# Patient Record
Sex: MALE | Race: WHITE
[De-identification: names, ages, dates, MRNs, and addresses within clinical notes are randomized per-mention and may not be internally consistent; named-entity substitution may affect disease eponyms.]

---
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
## 2021-04-04 ENCOUNTER — HOSPITAL ENCOUNTER (EMERGENCY)
Dept: HOSPITAL 8 - ED | Age: 21
Discharge: HOME | End: 2021-04-04
Payer: MEDICAID

## 2021-04-04 VITALS — BODY MASS INDEX: 17.26 KG/M2 | HEIGHT: 70 IN | WEIGHT: 120.59 LBS

## 2021-04-04 VITALS — DIASTOLIC BLOOD PRESSURE: 68 MMHG | SYSTOLIC BLOOD PRESSURE: 102 MMHG
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
## 2021-04-04 DIAGNOSIS — R55: ICD-10-CM
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
## 2021-04-04 DIAGNOSIS — R94.31: ICD-10-CM

## 2021-04-04 DIAGNOSIS — S01.511A: Primary | ICD-10-CM

## 2021-04-04 DIAGNOSIS — R42: ICD-10-CM

## 2021-04-04 DIAGNOSIS — R51.9: ICD-10-CM

## 2021-04-04 LAB
ALBUMIN SERPL-MCNC: 4.7 G/DL (ref 3.4–5)
ANION GAP SERPL CALC-SCNC: 5 MMOL/L (ref 5–15)
BASOPHILS # BLD AUTO: 0 X10^3/UL (ref 0–0.3)
BASOPHILS NFR BLD AUTO: 0 % (ref 0–1)
CALCIUM SERPL-MCNC: 9.3 MG/DL (ref 8.5–10.1)
CHLORIDE SERPL-SCNC: 105 MMOL/L (ref 98–107)
CREAT SERPL-MCNC: 0.97 MG/DL (ref 0.7–1.3)
EOSINOPHIL # BLD AUTO: 0.1 X10^3/UL (ref 0–0.8)
EOSINOPHIL NFR BLD AUTO: 1 % (ref 1–7)
ERYTHROCYTE [DISTWIDTH] IN BLOOD BY AUTOMATED COUNT: 12.9 % (ref 9.4–14.8)
LYMPHOCYTES # BLD AUTO: 0.9 X10^3/UL (ref 1–6.1)
LYMPHOCYTES NFR BLD AUTO: 10 % (ref 22–44)
MCH RBC QN AUTO: 33.2 PG (ref 27.5–34.5)
MCHC RBC AUTO-ENTMCNC: 34.7 G/DL (ref 33.2–36.2)
MD: NO
MONOCYTES # BLD AUTO: 0.7 X10^3/UL (ref 0–1.4)
MONOCYTES NFR BLD AUTO: 7 % (ref 2–9)
NEUTROPHILS # BLD AUTO: 7.5 X10^3/UL (ref 1.8–8)
NEUTROPHILS NFR BLD AUTO: 82 % (ref 42–75)
PLATELET # BLD AUTO: 243 X10^3/UL (ref 130–400)
PMV BLD AUTO: 8.9 FL (ref 7.4–10.4)
RBC # BLD AUTO: 4.92 X10^6/UL (ref 4.38–5.82)

## 2021-04-04 PROCEDURE — 80048 BASIC METABOLIC PNL TOTAL CA: CPT

## 2021-04-04 PROCEDURE — 12052 INTMD RPR FACE/MM 2.6-5.0 CM: CPT

## 2021-04-04 PROCEDURE — 85025 COMPLETE CBC W/AUTO DIFF WBC: CPT

## 2021-04-04 PROCEDURE — 36415 COLL VENOUS BLD VENIPUNCTURE: CPT

## 2021-04-04 PROCEDURE — 93005 ELECTROCARDIOGRAM TRACING: CPT

## 2021-04-04 PROCEDURE — 99285 EMERGENCY DEPT VISIT HI MDM: CPT

## 2021-04-04 PROCEDURE — 70486 CT MAXILLOFACIAL W/O DYE: CPT

## 2021-04-04 PROCEDURE — 96360 HYDRATION IV INFUSION INIT: CPT

## 2021-04-04 PROCEDURE — 82040 ASSAY OF SERUM ALBUMIN: CPT

## 2021-04-04 NOTE — NUR
PT PROVIDED A PACK OF CRACKERS AND SOME OJ TO SIP ON BEFORE ATTEMPTING TO 
STAND. PT WITH NO SIGNS OR SYMPTOMS OF ACUTE DSITRESS NOTED RESPIRATIONS EVEN 
AND UNLABORED

## 2021-04-04 NOTE — NUR
MD AT BEDSIDE TO ASSESS. PT SUPINE IN [facility] WITH MOTHER AT BEDSIDE NO SIGNS OR 
SYMPTOMS OF ACUTE DISTRESS NOTED RESPIRATIONS EVEN AND UNLABORED

## 2021-04-04 NOTE — NUR
DR LYLE AT BEDSIDE TO ASSESS. PT IN BED WITH NO SIGNS OR SYMPTOMS OF ACUTE 
DISTRSES NOTED RESPIRATIONS EVEN AND UNLABORED

## 2021-04-09 ENCOUNTER — HOSPITAL ENCOUNTER (EMERGENCY)
Dept: HOSPITAL 8 - ED | Age: 21
Discharge: HOME | End: 2021-04-09
Payer: MEDICAID

## 2021-04-09 VITALS — DIASTOLIC BLOOD PRESSURE: 91 MMHG | SYSTOLIC BLOOD PRESSURE: 135 MMHG

## 2021-04-09 VITALS — HEIGHT: 70 IN | WEIGHT: 118.83 LBS | BODY MASS INDEX: 17.01 KG/M2
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
## 2021-04-09 DIAGNOSIS — S01.511D: Primary | ICD-10-CM

## 2021-04-09 PROCEDURE — 99282 EMERGENCY DEPT VISIT SF MDM: CPT

## 2021-04-09 NOTE — NUR
CARE FOR DC PROVIDED.  NO ACUTE DISTRESS NOTED.  NO IV TO DC.  REVIEWED DC 
INSTRUCTIONS WITH PT.  UNDERSTANDING VERBALIZED.  PT LEFT AMB, GAIT STEADY.

## 2021-04-20 ENCOUNTER — HOSPITAL ENCOUNTER (OUTPATIENT)
Dept: HOSPITAL 8 - OR | Age: 21
Discharge: HOME | End: 2021-04-20
Attending: OTOLARYNGOLOGY
Payer: MEDICAID

## 2021-04-20 VITALS — SYSTOLIC BLOOD PRESSURE: 108 MMHG | DIASTOLIC BLOOD PRESSURE: 72 MMHG

## 2021-04-20 VITALS — BODY MASS INDEX: 16.95 KG/M2 | WEIGHT: 118.39 LBS | HEIGHT: 70 IN
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
## 2021-04-20 DIAGNOSIS — Z20.822: ICD-10-CM
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
## 2021-04-20 DIAGNOSIS — F12.90: ICD-10-CM

## 2021-04-20 DIAGNOSIS — S02.2XXA: Primary | ICD-10-CM

## 2021-04-20 DIAGNOSIS — F17.210: ICD-10-CM
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
## 2021-04-20 DIAGNOSIS — Z72.89: ICD-10-CM

## 2021-04-20 PROCEDURE — 87635 SARS-COV-2 COVID-19 AMP PRB: CPT

## 2021-04-20 PROCEDURE — 30520 REPAIR OF NASAL SEPTUM: CPT

## 2024-01-24 ENCOUNTER — OFFICE VISIT (OUTPATIENT)
Dept: MEDICAL GROUP | Facility: CLINIC | Age: 24
End: 2024-01-24

## 2024-01-24 VITALS
WEIGHT: 124.2 LBS | BODY MASS INDEX: 17.78 KG/M2 | SYSTOLIC BLOOD PRESSURE: 128 MMHG | OXYGEN SATURATION: 97 % | HEIGHT: 70 IN | TEMPERATURE: 98.4 F | DIASTOLIC BLOOD PRESSURE: 88 MMHG | HEART RATE: 98 BPM

## 2024-01-24 DIAGNOSIS — R55 VASOVAGAL SYNCOPE: ICD-10-CM

## 2024-01-24 DIAGNOSIS — R59.9 LYMPH NODE ENLARGEMENT: ICD-10-CM

## 2024-01-24 PROCEDURE — 3079F DIAST BP 80-89 MM HG: CPT | Performed by: HEALTH CARE PROVIDER

## 2024-01-24 PROCEDURE — 3074F SYST BP LT 130 MM HG: CPT | Performed by: HEALTH CARE PROVIDER

## 2024-01-24 PROCEDURE — 99203 OFFICE O/P NEW LOW 30 MIN: CPT | Mod: GE | Performed by: HEALTH CARE PROVIDER

## 2024-01-24 NOTE — ASSESSMENT & PLAN NOTE
History is most consistent with vasovagal syncope. Low concern for cardiac etiology given his prodromal symptoms, young age, benign family history. Discussed possibly utility of EKG and basic lab work but he declines at this time. Will consider further testing if another episode of syncope occurs.

## 2024-01-24 NOTE — ASSESSMENT & PLAN NOTE
Presentation and exam is most consistent with reactive submandibular lymph node, likely related to viral illness. No red flag symptoms. Recommend watchful waiting with expectation of improvement in next 2-4 weeks. If new symptoms develop or current symptoms worsen, he will send me MyChart message and will consider ordering CBC, soft tissue US.

## 2024-06-16 ENCOUNTER — HOSPITAL ENCOUNTER (OUTPATIENT)
Dept: RADIOLOGY | Facility: MEDICAL CENTER | Age: 24
End: 2024-06-16
Attending: HEALTH CARE PROVIDER

## 2024-06-16 DIAGNOSIS — R59.9 LYMPH NODE ENLARGEMENT: ICD-10-CM

## 2024-06-16 PROCEDURE — 76536 US EXAM OF HEAD AND NECK: CPT

## 2024-07-18 NOTE — PROGRESS NOTES
"  [facility]    Subjective:     CC: Establish Care    HPI:   Marques Aden is a 23 y.o. male with:    Problem   Lymph Node Enlargement    Patient describes 2 week history of palpable mass of anterior neck on R side of larynx. It is mildly tender. It has not changed in size since noted two weeks ago (around 1/10/24). He states that he has also experienced mild sore throat at times in past few weeks. No fever or chills. No night sweats. No weight changes. No dysphagia.     Syncope    22 yo M presents to establish care. He describes history of syncope that has occurred approximately 4-5 times since age 13 yo. He has previously received workup with no underlying etiology found.     The last episode that he describes was about 1 year ago. He was eating in restaurant when he felt very warm, nauseated, and lightheaded. He attempted to go to bathroom since he had nausea and thought he needed to vomit. His lightheadedness persisted, he sat down, then fell to floor. LOC lasted for a few seconds. No episodes of syncope since that time.         No current [facility]-ordered outpatient medications on file.     No current [facility]-ordered facility-administered medications on file.         ROS:  Negative except as noted in HPI        Objective:     Exam:  /88 (BP Location: Left arm, Patient Position: Sitting, BP Cuff Size: Adult)   Pulse 98   Temp 36.9 °C (98.4 °F) (Temporal)   Ht 1.778 m (5' 10\")   Wt 56.3 kg (124 lb 3.2 oz)   SpO2 97%   BMI 17.82 kg/m²  Body mass index is 17.82 kg/m².    Gen:  Alert and oriented, No apparent distress.  HEENT: Neck is supple, single palpable R submandibular lymph node with mild tenderness, EOMI, no pharyngeal erythema or exudate  Lungs:  Normal effort, CTA bilaterally, no wheezes, rhonchi, or rales  CV:  Regular rate and rhythm. No murmurs, rubs, or gallops.  Abd:      Soft, non-tender, non-distended  Ext:  No clubbing, cyanosis, edema.      Labs:     None      Assessment & Plan: " -Increased left sided weakness and dysarthria c/t baseline;   -no stroke on MRI  -possible recrudescence of prior CVA?  - BP goal: permissive htn to 220   - Antiplatelet: ASA and plavix for now; duration TBD based on review of vessel imaging  - TTE ordered; pending   - check routine EEG given cogntiive complaint  -Neurology following       23 y.o. male with the following -     Problem List Items Addressed This Visit       Syncope     History is most consistent with vasovagal syncope. Low concern for cardiac etiology given his prodromal symptoms, young age, benign family history. Discussed possibly utility of EKG and basic lab work but he declines at this time. Will consider further testing if another episode of syncope occurs.         Lymph node enlargement     Presentation and exam is most consistent with reactive submandibular lymph node, likely related to viral illness. No red flag symptoms. Recommend watchful waiting with expectation of improvement in next 2-4 weeks. If new symptoms develop or current symptoms worsen, he will send me [employer] message and will consider ordering CBC, soft tissue US.                Return in about 1 year (around 1/24/2025).          Randall Perez MD  [facility]  PGY-3

## 2025-04-10 ENCOUNTER — OFFICE VISIT (OUTPATIENT)
Dept: MEDICAL GROUP | Facility: CLINIC | Age: 25
End: 2025-04-10

## 2025-04-10 VITALS
OXYGEN SATURATION: 95 % | BODY MASS INDEX: 17.47 KG/M2 | HEART RATE: 100 BPM | SYSTOLIC BLOOD PRESSURE: 112 MMHG | TEMPERATURE: 99.1 F | DIASTOLIC BLOOD PRESSURE: 74 MMHG | HEIGHT: 70 IN | WEIGHT: 122 LBS | RESPIRATION RATE: 12 BRPM

## 2025-04-10 DIAGNOSIS — R55 VASOVAGAL SYNCOPE: ICD-10-CM

## 2025-04-10 DIAGNOSIS — R00.2 PALPITATIONS: ICD-10-CM

## 2025-04-10 DIAGNOSIS — R07.9 CHEST PAIN, UNSPECIFIED TYPE: ICD-10-CM

## 2025-04-10 PROCEDURE — 3074F SYST BP LT 130 MM HG: CPT

## 2025-04-10 PROCEDURE — 99213 OFFICE O/P EST LOW 20 MIN: CPT

## 2025-04-10 PROCEDURE — 3078F DIAST BP <80 MM HG: CPT

## 2025-04-10 ASSESSMENT — ENCOUNTER SYMPTOMS
DIZZINESS: 1
FEVER: 0
CHILLS: 0
ABDOMINAL PAIN: 0
COUGH: 0
ORTHOPNEA: 0
HEADACHES: 0
VOMITING: 0
HEARTBURN: 0
PALPITATIONS: 1
DIARRHEA: 0
SEIZURES: 0
BLOOD IN STOOL: 0
CONSTIPATION: 0
SHORTNESS OF BREATH: 0
WEAKNESS: 0
NAUSEA: 0
BACK PAIN: 0

## 2025-04-10 NOTE — ASSESSMENT & PLAN NOTE
Most likely MSK (started around time he lifted some heavy objects, reproducible with palpation on physical exam, exacerbated by movement, no exercise intolerance), but given palpitations and pmhx of syncope, will work up with labs, cxr, EKG.  - Pt self-pay, declines EKG due to cost

## 2025-04-10 NOTE — PROGRESS NOTES
This note is formatted in an APSO format, for additional subjective and objective evaluation please scroll to the bottom of the note.    CC:  Chief Complaint   Patient presents with    Requesting Labs    Chest Pain     Assessment/Plan:  Problem List Items Addressed This Visit       Chest pain    Most likely MSK (started around time he lifted some heavy objects, reproducible with palpation on physical exam, exacerbated by movement, no exercise intolerance), but given palpitations and pmhx of syncope, will work up with labs, cxr, EKG.  - Pt self-pay, declines EKG due to cost           Relevant Orders    EKG - Clinic Performed    Basic Metabolic Panel    TSH WITH REFLEX TO FT4    [facility]    DX-CHEST-2 VIEWS    Palpitations    Relevant Orders    EKG - Clinic Performed    Basic Metabolic Panel    TSH WITH REFLEX TO FT4    [facility]    DX-CHEST-2 VIEWS    Syncope      Orders Placed This Encounter    DX-CHEST-2 VIEWS    Basic Metabolic Panel    TSH WITH REFLEX TO FT4    [facility]    EKG - Clinic Performed       HISTORY OF PRESENT ILLNESS:     Chest pain, feels like a muscle strain. Couple months. Cough, laugh, takes a deep breath. Sometimes sharp like a knife, sometimes dull. He feels muscle soreness constantly.   It feels like he gets palpitations once every 2 weeks or so. They last 30 sec before improving. Started about 2 months ago.  This started around the time that he spent a day lifting some heavy tires.  No shortness of breath. Not getting weak or tired.    He gets hot and shaky, since he was 12. He has passed out a couple times. Last time it happened he hydrated well, every time it was hot out. Near syncope 6 months ago.    He does not lift weights, no exercise. He does bartending.    Review of Systems   Constitutional:  Negative for chills and fever.   HENT:  Negative for congestion.    Respiratory:  Negative for cough and shortness of breath.    Cardiovascular:  Positive for chest pain and palpitations.  "Negative for orthopnea and leg swelling.   Gastrointestinal:  Negative for abdominal pain, blood in stool, constipation, diarrhea, heartburn, nausea and vomiting.   Genitourinary:  Negative for dysuria.   Musculoskeletal:  Negative for back pain.   Neurological:  Positive for dizziness (related to syncopal/presyncopal episodes). Negative for seizures, weakness and headaches.         Problem   Chest Pain   Palpitations   Syncope    SM Jan '24:  24 yo M presents to [facility] care. He describes history of syncope that has occurred approximately 4-5 times since age 11 yo. He has previously received workup with no underlying etiology found.     The last episode that he describes was about 1 year ago. He was eating in restaurant when he felt very warm, nauseated, and lightheaded. He attempted to go to bathroom since he had nausea and thought he needed to vomit. His lightheadedness persisted, he sat down, then fell to floor. LOC lasted for a few seconds. No episodes of syncope since that time.           Exam:    /74 (BP Location: Left arm, Patient Position: Sitting, BP Cuff Size: Adult)   Pulse 100   Temp 37.3 °C (99.1 °F) (Temporal)   Resp 12   Ht 1.778 m (5' 10\")   Wt 55.3 kg (122 lb)   SpO2 95%   BMI 17.51 kg/m²  Body mass index is 17.51 kg/m².    Physical Exam  Constitutional:       Appearance: Normal appearance.   HENT:      Head: Normocephalic and atraumatic.      Right Ear: External ear normal.      Left Ear: External ear normal.      Mouth/Throat:      Mouth: Mucous membranes are moist.   Eyes:      General: No scleral icterus.     Extraocular Movements: Extraocular movements intact.      Conjunctiva/sclera: Conjunctivae normal.   Pulmonary:      Effort: Pulmonary effort is normal.   Chest:      Chest wall: Tenderness (mild TTP over left medial chest) present.   Abdominal:      General: Abdomen is flat. There is no distension.   Musculoskeletal:         General: Normal range of motion.      Cervical " back: Normal range of motion and neck supple.   Skin:     General: Skin is warm and dry.      Capillary Refill: Capillary refill takes less than 2 seconds.   Neurological:      General: No focal deficit present.      Mental Status: He is alert and oriented to person, place, and time.       No follow-ups on file.    Hema Robles MD  [facility]